# Patient Record
Sex: FEMALE | Race: BLACK OR AFRICAN AMERICAN | NOT HISPANIC OR LATINO | Employment: PART TIME | URBAN - METROPOLITAN AREA
[De-identification: names, ages, dates, MRNs, and addresses within clinical notes are randomized per-mention and may not be internally consistent; named-entity substitution may affect disease eponyms.]

---

## 2022-05-30 ENCOUNTER — HOSPITAL ENCOUNTER (EMERGENCY)
Facility: HOSPITAL | Age: 42
Discharge: HOME | End: 2022-05-30
Attending: FAMILY MEDICINE

## 2022-05-30 VITALS
OXYGEN SATURATION: 98 % | SYSTOLIC BLOOD PRESSURE: 142 MMHG | TEMPERATURE: 98 F | HEIGHT: 64 IN | RESPIRATION RATE: 18 BRPM | WEIGHT: 205 LBS | BODY MASS INDEX: 35 KG/M2 | DIASTOLIC BLOOD PRESSURE: 81 MMHG | HEART RATE: 86 BPM

## 2022-05-30 DIAGNOSIS — R52 PAIN: ICD-10-CM
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
## 2022-05-30 DIAGNOSIS — S00.83XA CONTUSION OF FACE, INITIAL ENCOUNTER: ICD-10-CM

## 2022-05-30 DIAGNOSIS — S50.12XA CONTUSION OF LEFT FOREARM, INITIAL ENCOUNTER: ICD-10-CM

## 2022-05-30 DIAGNOSIS — S62.632A CLOSED DISPLACED FRACTURE OF DISTAL PHALANX OF RIGHT MIDDLE FINGER, INITIAL ENCOUNTER: ICD-10-CM

## 2022-05-30 LAB — B-HCG SERPL QL: NEGATIVE

## 2022-05-30 PROCEDURE — 29130 APPL FINGER SPLINT STATIC: CPT

## 2022-05-30 PROCEDURE — 99284 EMERGENCY DEPT VISIT MOD MDM: CPT | Mod: 25

## 2022-05-30 PROCEDURE — 81025 URINE PREGNANCY TEST: CPT | Performed by: NURSE PRACTITIONER

## 2022-05-30 PROCEDURE — 25000003 PHARM REV CODE 250: Performed by: NURSE PRACTITIONER

## 2022-05-30 RX ORDER — TRAMADOL HYDROCHLORIDE 50 MG/1
50 TABLET ORAL EVERY 6 HOURS PRN
Qty: 12 TABLET | Refills: 0 | Status: SHIPPED | OUTPATIENT
Start: 2022-05-30

## 2022-05-30 RX ORDER — KETOROLAC TROMETHAMINE 10 MG/1
10 TABLET, FILM COATED ORAL ONCE
Status: COMPLETED | OUTPATIENT
Start: 2022-05-30 | End: 2022-05-30

## 2022-05-30 RX ADMIN — KETOROLAC TROMETHAMINE 10 MG: 10 TABLET, FILM COATED ORAL at 07:05
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
## 2022-05-31 NOTE — DISCHARGE INSTRUCTIONS
Elevate hand as much as possible.  Ice to hand.   Keep splint in place until seen by orthopedist.   Return to ED for worsening of symptoms.  Follow-up with orthopedist.  A referral has been made on your behalf to [facility]. You should receive a call in the next few day. If you do not please call them.

## 2025-04-02 ENCOUNTER — OFFICE VISIT (OUTPATIENT)
Dept: URGENT CARE | Facility: CLINIC | Age: 45
End: 2025-04-02

## 2025-04-02 VITALS
HEIGHT: 64 IN | WEIGHT: 267.19 LBS | SYSTOLIC BLOOD PRESSURE: 119 MMHG | OXYGEN SATURATION: 100 % | HEART RATE: 89 BPM | RESPIRATION RATE: 18 BRPM | TEMPERATURE: 98 F | BODY MASS INDEX: 45.62 KG/M2 | DIASTOLIC BLOOD PRESSURE: 88 MMHG

## 2025-04-02 DIAGNOSIS — R09.82 PND (POST-NASAL DRIP): Primary | ICD-10-CM

## 2025-04-02 PROCEDURE — 99214 OFFICE O/P EST MOD 30 MIN: CPT | Mod: PBBFAC

## 2025-04-02 RX ORDER — CETIRIZINE HYDROCHLORIDE 10 MG/1
10 TABLET ORAL DAILY
Qty: 30 TABLET | Refills: 0 | Status: SHIPPED | OUTPATIENT
Start: 2025-04-02 | End: 2025-05-02

## 2025-04-02 RX ORDER — FLUTICASONE PROPIONATE 50 MCG
1 SPRAY, SUSPENSION (ML) NASAL DAILY
Qty: 11.1 ML | Refills: 0 | Status: SHIPPED | OUTPATIENT
Start: 2025-04-02

## 2025-04-02 NOTE — LETTER
April 2, 2025      [facility]  [address] [address]  Phone: [phone]       Patient: Madhavi Wilson   YOB: 1980  Date of Visit: 04/02/2025    To Whom It May Concern:    Sid Wilson  was at [facility] on 04/02/2025. The patient may return to work/school on 04/03/2025 with no restrictions. If you have any questions or concerns, or if I can be of further assistance, please do not hesitate to contact me.    Sincerely,    MAX Boone

## 2025-04-03 NOTE — PATIENT INSTRUCTIONS
Definition:  Post-nasal drip (PND) is a condition where mucus from the nasal passages drips down the back of the throat.   Causes:   Allergies, Sinusitis (inflammation of the sinuses), Common cold, Flu, Acid reflux, Dry air, and Spicy foods.   Symptoms:   Sensation of mucus dripping down the throat, Constant throat clearing, Cough, Sore throat, and Bad breath.   Treatment:  Identify and treat the underlying cause: If allergies are the cause, antihistamines or nasal steroids may help. Sinusitis may require antibiotics or nasal irrigation.   Nasal sprays: Decongestant or saline nasal sprays can help reduce mucus production and clear nasal passages.   Humidifier: Adding moisture to the air can help thin the mucus and make it easier to drain.   Avoid triggers: Stay away from allergens, irritants, and spicy foods that worsen symptoms.   Lifestyle changes: Drink plenty of fluids, get enough rest, and avoid smoking.   Medications: In some cases, medications such as mucolytics or proton pump inhibitors may be prescribed to thin mucus or reduce acid reflux.  When to See a Doctor:   See a doctor if:   PND persists for more than a few weeks.   Symptoms are severe or accompanied by fever, headache, or ear pain.   You suspect an underlying medical condition, such as sinusitis or acid reflux.   Note:  PND is a common conto address the underlying cause and relieve symptoms

## 2025-04-03 NOTE — PROGRESS NOTES
"Subjective:       Patient ID: Madhavi Wilson is a 45 y.o. female.    Vitals:  height is 5' 4" (1.626 m) and weight is 121.2 kg (267 lb 3.2 oz). Her oral temperature is 98.4 °F (36.9 °C). Her blood pressure is 119/88 and her pulse is 89. Her respiration is 18 and oxygen saturation is 100%.     Chief Complaint: Sinus Problem (Sinus drip, congestion, x 3 days.  Took covid test today at work, negative.)    Agree with CC, 44 y/o AAF c/o symptoms x 3 days; has not taken any OTC medications, admits to sleeping with window unit and exposure to dust.         Constitution: Negative for chills and fever.   HENT:  Positive for congestion, postnasal drip, sore throat and voice change. Negative for ear pain.    Respiratory:  Negative for cough.    Allergic/Immunologic: Negative for seasonal allergies.       Objective:      Physical Exam   Constitutional: She is oriented to person, place, and time. She is cooperative. She is easily aroused.  Non-toxic appearance. She does not appear ill. obesityawake  HENT:   Head: Normocephalic and atraumatic.   Ears:   Right Ear: Tympanic membrane and ear canal normal.   Left Ear: Tympanic membrane and ear canal normal.   Nose: Nose normal.   Mouth/Throat: Uvula is midline and mucous membranes are normal. Cobblestoning present. Tonsils are 2+ on the right. Tonsils are 2+ on the left.   Eyes: Conjunctivae and lids are normal.   Neck: Neck supple.   Cardiovascular: Normal rate.   Pulmonary/Chest: Effort normal.   Lymphadenopathy:     She has no cervical adenopathy.   Neurological: She is alert, oriented to person, place, and time and easily aroused. GCS eye subscore is 4. GCS verbal subscore is 5. GCS motor subscore is 6.   Skin: Skin is warm, dry, intact and not diaphoretic. Capillary refill takes less than 2 seconds.   Psychiatric: Her speech is normal and behavior is normal.   Nursing note and vitals reviewed.        Assessment:       1. PND (post-nasal drip)          Plan:     No testing " warranted, encouraged to avoid any known triggers, ensure she remains hydrated, follow up with PCP as needed.     PND (post-nasal drip)  -     cetirizine (ZYRTEC) 10 MG tablet; Take 1 tablet (10 mg total) by mouth once daily.  Dispense: 30 tablet; Refill: 0  -     fluticasone propionate (FLONASE) 50 mcg/actuation nasal spray; 1 spray (50 mcg total) by Each Nostril route once daily.  Dispense: 11.1 mL; Refill: 0